# Patient Record
Sex: MALE
[De-identification: names, ages, dates, MRNs, and addresses within clinical notes are randomized per-mention and may not be internally consistent; named-entity substitution may affect disease eponyms.]

---

## 2017-08-29 ENCOUNTER — HOSPITAL ENCOUNTER (EMERGENCY)
Dept: HOSPITAL 31 - C.ER | Age: 22
Discharge: HOME | End: 2017-08-29
Payer: COMMERCIAL

## 2017-08-29 VITALS — TEMPERATURE: 98 F

## 2017-08-29 VITALS
OXYGEN SATURATION: 98 % | DIASTOLIC BLOOD PRESSURE: 65 MMHG | HEART RATE: 98 BPM | RESPIRATION RATE: 18 BRPM | SYSTOLIC BLOOD PRESSURE: 112 MMHG

## 2017-08-29 VITALS — BODY MASS INDEX: 23.5 KG/M2
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
## 2017-08-29 DIAGNOSIS — S92.421A: Primary | ICD-10-CM

## 2017-08-29 PROCEDURE — 96372 THER/PROPH/DIAG INJ SC/IM: CPT

## 2017-08-29 PROCEDURE — 73630 X-RAY EXAM OF FOOT: CPT

## 2017-08-29 PROCEDURE — 99285 EMERGENCY DEPT VISIT HI MDM: CPT

## 2017-08-29 NOTE — C.PDOC
History Of Present Illness


22 year old male presents to the ED with complaints of pain to first and second 

toes of the right foot after slipping and stubbing toes on steps 20 minutes 

prior to arrival. Patient denies changes in sensation or other complaints at 

this time. 


Time Seen by Provider: 08/29/17 16:30


Chief Complaint (Nursing): Lower Extremity Problem/Injury


History Per: Patient


History/Exam Limitations: no limitations


Onset/Duration Of Symptoms: Mins


Current Symptoms Are (Timing): Still Present


Recent travel outside of the [place]: No





- Ankle/Foot


Description Of Injury: Struck With Object ("stubbed" his foot )





Past Medical History


Reviewed: Historical Data, Nursing Documentation, Vital Signs


Vital Signs: 


 Last Vital Signs











Temp  98 F   08/29/17 16:29


 


Pulse  100 H  08/29/17 16:29


 


Resp  20   08/29/17 16:29


 


BP  112/74   08/29/17 16:29


 


Pulse Ox  100   08/29/17 18:04














- [facility] Procedures








OTHER GROUP THERAPY (07/21/13)








Family History: States: Other


Other Family History: Non-contributory.





- Social History


Hx Tobacco Use: Yes


Hx Alcohol Use: Yes


Hx Substance Use: No





- Immunization History


Hx Tetanus Toxoid Vaccination: No


Hx Influenza Vaccination: No


Hx Pneumococcal Vaccination: No





Review Of Systems


Constitutional: Negative for: Fever


Cardiovascular: Negative for: Chest Pain


Respiratory: Negative for: Shortness of Breath


Gastrointestinal: Negative for: Nausea, Vomiting


Musculoskeletal: Positive for: Foot Pain (right first and second toe pain )


Neurological: Negative for: Weakness, Numbness





Physical Exam





- Physical Exam


Appears: Non-toxic, No Acute Distress


Skin: Warm, Dry


Head: Atraumatic


Eye(s): bilateral: Normal Inspection


Chest: Symmetrical, No Deformity


Extremity: Normal ROM, Tenderness (Tenderness to first and second toes of the 

right foot ), No Pedal Edema, No Calf Tenderness, Capillary Refill (good 

capillary refill, less than two seconds ), No Deformity, No Swelling, Other (No 

tenderness over right ankle or dorsum of the foot)


Pulses: Left Dorsalis Pedis: Normal, Right Dorsalis Pedis: Normal


Neurological/Psych: Oriented x3, Normal Speech, Normal Cognition, Normal Motor, 

Normal Sensation


Gait: Steady





ED Course And Treatment


O2 Sat by Pulse Oximetry: 100 (room air )





- Other Rad


  ** Right Foot Radiographs


X-Ray: Interpreted by Me, Viewed By Me


Interpretation: FINDINGS:  BONES:  Mildly displaced fracture of the proximal 

1st phalanx with intra-articular extension. The remainder the visualized 

osseous structures appear intact.  JOINTS:  No dislocation.  SOFT TISSUES:  

Soft tissue swelling. No evidence of radiopaque foreign body.  OTHER FINDINGS:  

None.  IMPRESSION:  Mildly displaced fracture of the proximal 1st phalanx with 

intra-articular extension. Soft tissue swelling.


Progress Note: Right foot X-ray was taken. Patient was given Toradol and ice 

was applied.





Medical Decision Making


Medical Decision Making: 





xr r foot- intra-articular fracture distal phalanx great toe


podiatry resident came and eval in the ED and placed surgical shoe. 


pt to f/u in the office w Dr Noyola. 








Disposition





- Disposition


Referrals: 


Kaitlyn Noyola DPM [Staff Provider] - 


Disposition: HOME/ ROUTINE


Disposition Time: 18:00


Condition: STABLE


Additional Instructions: 


Please follow up with the foot doctor. Return to the ER for any worsening 

symptoms or for any other concerns. 


Prescriptions: 


Acetaminophen with Codeine [Tylenol with Codeine #3 Tablet] 1 each PO Q4H PRN #

6 tablet


 PRN Reason: Pain, Moderate (4-7)


Naproxen [Naprosyn] 500 mg PO Q12H PRN #10 tablet


 PRN Reason: Pain, Moderate (4-7)


Instructions:  Toe Fracture (ED)


Forms:  [facility] (English), Work Excuse





- Clinical Impression


Clinical Impression: 


 Fracture of great toe








- Scribe Statement


The provider has reviewed the documentation as recorded by the Natalieibtaye Gillespie





All medical record entries made by the Natalieibtaye were at my direction and 

personally dictated by me. I have reviewed the chart and agree that the record 

accurately reflects my personal performance of the history, physical exam, 

medical decision making, and the department course for this patient. I have 

also personally directed, reviewed, and agree with the discharge instructions 

and disposition.

## 2017-08-29 NOTE — CP.PCM.CON
History of Present Illness





- History of Present Illness


History of Present Illness: 





21 y/o male with no significant PMHx seen at bedside in ED complaining of right 

toe pain secondary to trauma. Patient states that he was at his apartment 

complex and slipped which caused him to stub his right toe. Patient states that 

the injury occured about an hour ago. Patient rates his pain as 10/10 on VAS 

but states that it is well managed with the pain medication. Patient denies of 

any other pedal complains at this time. Patient denies of any recent F/N/V/C/SOB

/CP.





PMHx: Denies


PSHx: Denies


Allergies: Fruits


SHx: 1 Pack a week smoking, occasional EtOH, denies of any illicit drug usage 





Review of Systems





- Constitutional


Constitutional: As Per HPI





Past Patient History





- Infectious Disease


Hx of Infectious Diseases: None





- Past Social History


Smoking Status: Light Smoker < 10 Cigarettes Daily





- CARDIAC


Hx Cardiac Disorders: No





- PULMONARY


Hx Tuberculosis: No





- NEUROLOGICAL


HX Cerebrovascular Accident: No





- HEMATOLOGICAL/ONCOLOGICAL


Hx Cancer: No





- PSYCHIATRIC


Hx Substance Use: No





- SURGICAL HISTORY


Hx Surgeries: No





- ANESTHESIA


Hx Anesthesia: No





Meds


Home Medications: 


 Home Medication List











 Medication  Instructions  Recorded  Confirmed  Type


 


Acetaminophen with Codeine 1 each PO Q4H PRN #6 tablet 08/29/17  Rx





[Tylenol with Codeine #3 Tablet]    


 


Naproxen [Naprosyn] 500 mg PO Q12H PRN #10 tablet 08/29/17  Rx











Allergies/Adverse Reactions: 


 Allergies











Allergy/AdvReac Type Severity Reaction Status Date / Time


 


apple Allergy  SWELLING Verified 08/29/17 16:32


 


plum Allergy  SWELLING Verified 08/29/17 16:32


 


peaches Allergy  SWELLING Uncoded 08/29/17 16:32


 


pears Allergy  SWELLING Uncoded 08/29/17 16:32














Physical Exam





- Constitutional


Appears: Well, Non-toxic, No Acute Distress





- Extremities Exam


Additional comments: 





Bilateral LE exam:





VASC: DP/PT pulses are palpable 2/4 b/l, CRT: < 3 sec to all digits, TG: warm 

to cool from proximal to distal, mild non-pitting edema noted at the distal tip 

of the right hallux with surrounding erythema





DERM: no open lesions, no interdigital maceration, no clinical suspicion of 

active infection





NEURO: protective sensation grossly intact 





ORTHO: pain on palpation of the distal right hallux, pain on active and passive 

ROM of the right hallux, no pain on ROM at R first ray, no pain on palpation of 

the sessamoid, active and passive ROM within normal limit in all 4 compartments

, MMT: 5/5 on DF, PF, inversion and eversion bilaterally





- Neurological Exam


Neurological exam: Alert, Oriented x3





- Psychiatric Exam


Psychiatric exam: Normal Affect, Normal Mood





Results





- Vital Signs


Recent Vital Signs: 


 Last Vital Signs











Temp  98 F   08/29/17 16:29


 


Pulse  100 H  08/29/17 16:29


 


Resp  20   08/29/17 16:29


 


BP  112/74   08/29/17 16:29


 


Pulse Ox  100   08/29/17 17:32














Assessment & Plan





- Assessment and Plan (Free Text)


Assessment: 





21 y/o male seen at bedside in ED for pain and swelling of the right hallux 

secondary to non-displaced, intra-articular fracture of the distal phalanx 


Plan: 





Patient seen and evaluated at bedside in ED


Patient discussed in details with attending Dr. Dennys Garcia reviewed (afebrile)


X-rays taken and evaluated: 


   -Vertical radiolucent line extending into DIPJ noted at the distal phalanx 

of the R hallux on an AP view indicating a fracture. No displacement of the 

fracture noted and alignment at the joint well preserved 


Coban applied to the Right hallux


Patient educated to Ice and elevate the RLE


Patient's right foot placed in a post-op shoe


Patient educated to remain in a post-op shoe at all times


Patient educated the possible consequences of not being compliant with the 

treatment plan


Patient educated to take over the counter ibuprofen if he has too much pain


Patient demonstrated verbal understanding


Thank you for allowing the podiatry team to take part in patient's care











- Date & Time


Date: 08/29/17


Time: 18:17

## 2017-08-29 NOTE — RAD
PROCEDURE:  Left Foot Radiographs.



HISTORY:

stubbed 1st-2nd toes  



COMPARISON:

None available.



FINDINGS:



BONES:

Mildly displaced fracture of the proximal 1st phalanx with 

intra-articular extension. The remainder the visualized osseous 

structures appear intact. 



JOINTS:

No dislocation. 



SOFT TISSUES:

Soft tissue swelling. No evidence of radiopaque foreign body. 



OTHER FINDINGS:

None.



IMPRESSION:

Mildly displaced fracture of the proximal 1st phalanx with 

intra-articular extension. Soft tissue swelling.

## 2017-09-09 ENCOUNTER — HOSPITAL ENCOUNTER (EMERGENCY)
Dept: HOSPITAL 31 - C.ER | Age: 22
Discharge: HOME | End: 2017-09-09
Payer: MEDICAID

## 2017-09-09 VITALS
RESPIRATION RATE: 18 BRPM | HEART RATE: 78 BPM | TEMPERATURE: 98.1 F | SYSTOLIC BLOOD PRESSURE: 115 MMHG | OXYGEN SATURATION: 100 % | DIASTOLIC BLOOD PRESSURE: 74 MMHG

## 2017-09-09 VITALS — BODY MASS INDEX: 23.5 KG/M2

## 2017-09-09 DIAGNOSIS — S92.401G: Primary | ICD-10-CM
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
## 2017-09-09 NOTE — C.PDOC
History Of Present Illness


22 year old male who presents to the ER with a complaint of right foot pain. 

Patient was recently see in the ED on 08/29/17 after sustaining a fracture to 

the right great toe. Patient reports he stubbed his toe on a mirror earlier 

today and states his coworker also stepped on foot, but he was wearing ortho 

shoe. Patient notes he had an appointment with podiatry on Monday; however, he 

was not able to go due to work and school. Patient states he has run out of the 

pain medication he was given during his last visit and is requesting a refill. 

Denies weakness or numbness.


Time Seen by Provider: 09/09/17 22:30


Chief Complaint (Nursing): Lower Extremity Problem/Injury


History Per: Patient


History/Exam Limitations: no limitations


Onset/Duration Of Symptoms: Hrs


Current Symptoms Are (Timing): Still Present


Recent travel outside of the [place]: No





- Ankle/Foot


Description Of Injury: Struck With Object


Currently Unable To: Bear Weight





Past Medical History


Reviewed: Historical Data, Nursing Documentation, Vital Signs


Vital Signs: 


 Last Vital Signs











Temp  98.1 F   09/09/17 22:17


 


Pulse  78   09/09/17 22:17


 


Resp  18   09/09/17 22:17


 


BP  115/74   09/09/17 22:17


 


Pulse Ox  100   09/09/17 23:16














- Medical History


PMH: Fractures (toe)


Surgical History: No Surg Hx





- [facility] Procedures








OTHER GROUP THERAPY (07/21/13)








Family History: States: Unknown Family Hx





- Social History


Hx Tobacco Use: Yes


Hx Alcohol Use: Yes


Hx Substance Use: No





- Immunization History


Hx Tetanus Toxoid Vaccination: No


Hx Influenza Vaccination: No


Hx Pneumococcal Vaccination: No





Review Of Systems


Musculoskeletal: Positive for: Foot Pain


Neurological: Negative for: Weakness, Numbness





Physical Exam





- Physical Exam


Appears: Non-toxic


Skin: Normal Color, Warm, Dry


Head: Atraumatic, Normacephalic


Eye(s): bilateral: Normal Inspection


Oral Mucosa: Moist


Neck: Normal ROM


Chest: Symmetrical


Extremity: Normal ROM, Tenderness (right great toe), Capillary Refill (Good < 

2seconds), Swelling (Mild to right great toe)


Pulses: Left Dorsalis Pedis: Normal, Right Dorsalis Pedis: Normal


Neurological/Psych: Oriented x3, Normal Speech


Gait: Steady





ED Course And Treatment


O2 Sat by Pulse Oximetry: 100 (Room air)


Pulse Ox Interpretation: Normal





Medical Decision Making


Medical Decision Making: 





Impression: 22 year old male with fracture to great toe.





Patient refuses x-rays and is requesting a refill of his pain medication until 

he can follow up with podiatry.





Disposition


Counseled Patient/Family Regarding: Diagnosis, Need For Followup, Rx Given





- Disposition


Referrals: 


Kaitlyn Noyola DPM [Staff Provider] - 


Disposition: HOME/ ROUTINE


Disposition Time: 22:40


Condition: STABLE


Additional Instructions: 


Please follow up with podiatry


take pain medicine as needed


Prescriptions: 


traMADol [Ultram] 50 mg PO Q8 #20 tab


Instructions:  Toe Fracture (ED)


Forms:  [employer] [facility] (English), Work Excuse





- POA


Present On Arrival: None





- Clinical Impression


Clinical Impression: 


 Fracture of great toe








- Scribe Statement


The provider has reviewed the documentation as recorded by the Scribe





Dominic Velasquez





All medical record entries made by the Scribe were at my direction and 

personally dictated by me. I have reviewed the chart and agree that the record 

accurately reflects my personal performance of the history, physical exam, 

medical decision making, and the department course for this patient. I have 

also personally directed, reviewed, and agree with the discharge instructions 

and disposition.

## 2018-01-27 ENCOUNTER — HOSPITAL ENCOUNTER (EMERGENCY)
Dept: HOSPITAL 31 - C.ER | Age: 23
Discharge: HOME | End: 2018-01-27
Payer: MEDICAID

## 2018-01-27 VITALS
DIASTOLIC BLOOD PRESSURE: 72 MMHG | HEART RATE: 80 BPM | TEMPERATURE: 98.3 F | OXYGEN SATURATION: 99 % | RESPIRATION RATE: 18 BRPM | SYSTOLIC BLOOD PRESSURE: 109 MMHG

## 2018-01-27 VITALS — BODY MASS INDEX: 23.5 KG/M2

## 2018-01-27 DIAGNOSIS — N34.2: Primary | ICD-10-CM

## 2018-01-27 DIAGNOSIS — Z72.0: ICD-10-CM

## 2018-01-27 LAB
BACTERIA #/AREA URNS HPF: (no result) /[HPF]
BILIRUB UR-MCNC: NEGATIVE MG/DL
GLUCOSE UR STRIP-MCNC: NORMAL MG/DL
LEUKOCYTE ESTERASE UR-ACNC: (no result) LEU/UL
PH UR STRIP: 5 [PH] (ref 5–8)
PROT UR STRIP-MCNC: NEGATIVE MG/DL
RBC # UR STRIP: NEGATIVE /UL
SP GR UR STRIP: 1.03 (ref 1–1.03)
URINE NITRATE: NEGATIVE
UROBILINOGEN UR-MCNC: NORMAL MG/DL (ref 0.2–1)

## 2018-01-27 PROCEDURE — 99284 EMERGENCY DEPT VISIT MOD MDM: CPT

## 2018-01-27 PROCEDURE — 81001 URINALYSIS AUTO W/SCOPE: CPT

## 2018-01-27 PROCEDURE — 87086 URINE CULTURE/COLONY COUNT: CPT

## 2018-01-27 PROCEDURE — 87491 CHLMYD TRACH DNA AMP PROBE: CPT

## 2018-01-27 PROCEDURE — 87591 N.GONORRHOEAE DNA AMP PROB: CPT

## 2018-01-27 PROCEDURE — 96372 THER/PROPH/DIAG INJ SC/IM: CPT

## 2018-01-27 NOTE — C.PDOC
Time Seen by Provider: 01/27/18 16:42


Chief Complaint (Nursing): Male Genitourinary


History Per: Patient


Onset/Duration Of Symptoms: Days (3)


Current Symptoms Are (Timing): Still Present


Severity: Moderate


Quality Of Discomfort: Burning


Associated Symptoms: Urinary Symptoms


Alleviating Factors: None


Additional History Per: Prior Records





Past Medical History


Reviewed: Historical Data, Nursing Documentation, Vital Signs


Vital Signs: 





 Last Vital Signs











Temp  98.6 F   01/27/18 14:47


 


Pulse  97 H  01/27/18 14:47


 


Resp  16   01/27/18 14:47


 


BP  109/68   01/27/18 14:47


 


Pulse Ox  98   01/27/18 14:47














- Medical History


PMH: No Chronic Diseases, Fractures (toe)


Surgical History: No Surg Hx





- [facility] Procedures











OTHER GROUP THERAPY (07/21/13)








Family History: States: Unknown Family Hx





- Social History


Hx Tobacco Use: Yes


Hx Alcohol Use: Yes


Hx Substance Use: No





- Immunization History


Hx Tetanus Toxoid Vaccination: No


Hx Influenza Vaccination: No


Hx Pneumococcal Vaccination: No





Review Of Systems


Except As Marked, All Systems Reviewed And Found Negative.


Constitutional: Negative for: Fever, Weakness


Cardiovascular: Negative for: Chest Pain


Respiratory: Negative for: Shortness of Breath


Gastrointestinal: Negative for: Vomiting, Abdominal Pain, Diarrhea


Genitourinary: Positive for: Dysuria, Penile Discharge.  Negative for: Frequency

, Scrotal Pain


Musculoskeletal: Negative for: Neck Pain, Back Pain


Skin: Negative for: Rash


Neurological: Negative for: Weakness, Numbness





Physical Exam





- Physical Exam


Appears: Non-toxic, No Acute Distress


Skin: Normal Color, Warm, Dry, No Rash


Head: Atraumatic, Normacephalic


Eye(s): bilateral: Normal Inspection, PERRL, EOMI


Neck: Normal ROM, Supple


Gastrointestinal/Abdominal: Soft, No Tenderness


Back: No CVA Tenderness


Male Genital: No Testicular Tenderness, No Testicular Swelling, No Inguinal 

Tenderness, No Inguinal Swelling, No Scrotal Swelling, No Circumcised, Other (

white/yellow discharge)


Extremity: Normal ROM


Neurological/Psych: Oriented x3, Normal Motor, Normal Sensation





ED Course And Treatment





- Laboratory Results


Lab Interpretation: Abnormal


Interpretation Of Abnormal: Bacturia


O2 Sat by Pulse Oximetry: 98


Pulse Ox Interpretation: Normal





Medical Decision Making


Medical Decision Making: 


Pt has symptoms/signs of urethritis, but has bacturia on the U/A. Urine GC/

Chlam and C&S sent. 





Disposition


Counseled Patient/Family Regarding: Studies Performed, Diagnosis, Need For 

Followup, Rx Given





- Disposition


Referrals: 


Lindsay Lackey MD [Staff Provider] - 


Disposition: HOME/ ROUTINE


Disposition Time: 17:37


Condition: STABLE


Additional Instructions: 


Drink plenty of fluids. Follow up with a Urologist for further evaluation and 

treatment. Return to the ER if you develop fever, vomiting, abdominal pain, 

testicle pain, back pain worsening of symptoms or if you have any other 

concerns. 


Prescriptions: 


Ciprofloxacin [Cipro] 1 tab PO BID #14 tab


Instructions:  Nonspecific Urethritis in Men (ED)





- Clinical Impression


Clinical Impression: 


 Urethritis

## 2018-03-24 ENCOUNTER — HOSPITAL ENCOUNTER (EMERGENCY)
Dept: HOSPITAL 31 - C.ER | Age: 23
Discharge: HOME | End: 2018-03-24
Payer: MEDICAID

## 2018-03-24 VITALS
OXYGEN SATURATION: 100 % | RESPIRATION RATE: 18 BRPM | SYSTOLIC BLOOD PRESSURE: 121 MMHG | TEMPERATURE: 98.2 F | DIASTOLIC BLOOD PRESSURE: 82 MMHG | HEART RATE: 73 BPM

## 2018-03-24 VITALS — BODY MASS INDEX: 23.5 KG/M2

## 2018-03-24 DIAGNOSIS — L03.313: Primary | ICD-10-CM

## 2018-03-24 DIAGNOSIS — L73.9: ICD-10-CM

## 2018-03-24 NOTE — C.PDOC
History Of Present Illness


22 year old male, with no significant PMHx, presents to the ED for evaluation 

of a "bite" to his chest which he noticed 2-3 days ago. Pt notes he has little 

bumps all over his chest, one worse then others. Patient states he last shaved 

his chest 2 weeks ago. Denies itching, fever, discharge, or pain. Tetanus shot 

was 1 year ago. 


Time Seen by Provider: 03/24/18 07:49


Chief Complaint (Nursing): Abnormal Skin Integrity


History Per: Patient


History/Exam Limitations: no limitations


Onset/Duration Of Symptoms: Days (2-3)


Current Symptoms Are (Timing): Still Present


Location Of Injury: Anterior: Chest


Quality Of Symptoms: denies: Swollen, Draining


Additional History Per: Patient





Past Medical History


Reviewed: Historical Data, Nursing Documentation, Vital Signs


Vital Signs: 


 Last Vital Signs











Temp  98.2 F   03/24/18 07:45


 


Pulse  73   03/24/18 07:45


 


Resp  18   03/24/18 07:45


 


BP  121/82   03/24/18 07:45


 


Pulse Ox  100   03/24/18 08:06














- Medical History


PMH: Fractures (toe)


Surgical History: No Surg Hx





- [facility] Procedures








OTHER GROUP THERAPY (07/21/13)








Family History: States: Unknown Family Hx





- Social History


Hx Tobacco Use: Yes


Hx Alcohol Use: Yes


Hx Substance Use: No





- Immunization History


Hx Tetanus Toxoid Vaccination: No


Hx Influenza Vaccination: No


Hx Pneumococcal Vaccination: No





Review Of Systems


Constitutional: Negative for: Fever, Chills





Physical Exam





- Physical Exam


Appears: Non-toxic, No Acute Distress


Skin: Warm, Dry, Other ((+) L upper chest warm : central area of induration 

with 2 cm area of erythema ; multiple areas of erythema at the base of hair 

follicles )


Head: Atraumatic, Normacephalic


Eye(s): bilateral: Normal Inspection, EOMI


Nose: Normal


Oral Mucosa: Moist


Neck: Normal ROM, Supple


Chest: Symmetrical, No Deformity, No Tenderness


Cardiovascular: Rhythm Regular


Respiratory: Normal Breath Sounds, No Rales, No Rhonchi, No Wheezing


Extremity: Normal ROM, Capillary Refill (less than 2 seconds )


Neurological/Psych: Oriented x3, Normal Speech, Normal Cognition





ED Course And Treatment


O2 Sat by Pulse Oximetry: 100 (on RA)


Pulse Ox Interpretation: Normal


Progress Note: On reassessment, patient is resting comfortably, is showing no 

signs of distress, and is stable for discharge. No area of fluctuance . 

Instructed warm compresses and wound check in 2 days.  Advised to follow up 

with his PMD within 1-2 days for further evaluation and/or return to the ED if 

symptoms persist or worsen.





Disposition





- Disposition


Disposition: HOME/ ROUTINE


Disposition Time: 08:00


Condition: STABLE


Additional Instructions: 


Follow up with primary medical doctor in 1-3 days without fail for further 

evaluation. Take medications as prescribed. Return to the emergency department 

at any time if symptoms persist or worsen.








Prescriptions: 


Cephalexin [cephalexin] 500 mg PO BID 7 Days  cap


Mupirocin 2% Ointment [Bactroban Ointment] 1 appl TP TID #1 tube


Sulfamethoxazole/Trimethoprim [Bactrim  mg-160 mg] 1 tab PO BID #14 tab


Instructions:  Folliculitis (DC)


Forms:  [facility] (English)





- Clinical Impression


Clinical Impression: 


 Cellulitis, Folliculitis








- PA / NP / Resident Statement


MD/DO has reviewed & agrees with the documentation as recorded.





- Scribe Statement


The provider has reviewed the documentation as recorded by the Scribe (Becki Johnson)








All medical record entries made by the Scribe were at my direction and 

personally dictated by me. I have reviewed the chart and agree that the record 

accurately reflects my personal performance of the history, physical exam, 

medical decision making, and the department course for this patient. I have 

also personally directed, reviewed, and agree with the discharge instructions 

and disposition.

## 2018-10-24 ENCOUNTER — HOSPITAL ENCOUNTER (EMERGENCY)
Dept: HOSPITAL 31 - C.ER | Age: 23
Discharge: HOME | End: 2018-10-24
Payer: MEDICAID

## 2018-10-24 VITALS — HEART RATE: 68 BPM | DIASTOLIC BLOOD PRESSURE: 73 MMHG | RESPIRATION RATE: 18 BRPM | SYSTOLIC BLOOD PRESSURE: 118 MMHG

## 2018-10-24 VITALS — TEMPERATURE: 99 F

## 2018-10-24 VITALS — BODY MASS INDEX: 23.5 KG/M2

## 2018-10-24 VITALS — OXYGEN SATURATION: 99 %

## 2018-10-24 DIAGNOSIS — L02.411: Primary | ICD-10-CM

## 2018-10-24 PROCEDURE — 99283 EMERGENCY DEPT VISIT LOW MDM: CPT

## 2018-10-24 PROCEDURE — 10060 I&D ABSCESS SIMPLE/SINGLE: CPT

## 2018-10-24 PROCEDURE — 87181 SC STD AGAR DILUTION PER AGT: CPT

## 2018-10-24 PROCEDURE — 96372 THER/PROPH/DIAG INJ SC/IM: CPT

## 2018-10-24 PROCEDURE — 87070 CULTURE OTHR SPECIMN AEROBIC: CPT

## 2018-10-24 NOTE — C.PDOC
History Of Present Illness


24yo male, comes to ER for evaluation of redness, swelling and pain to his right

axilla. Patient states it has been increasing in size x 1.5 weeks; he was seen 

by his PMD who prescribed him antibiotics. Otherwise, he denies any fever or 

chills. Patient denies any arm numbness or weakness.


Time Seen by Provider: 10/24/18 16:50


Chief Complaint (Nursing): Abnormal Skin Integrity


History Per: Patient


History/Exam Limitations: no limitations


Onset/Duration Of Symptoms: Days


Current Symptoms Are (Timing): Still Present


Quality Of Symptoms: Painful, Swollen


Additional History Per: Patient





Past Medical History


Reviewed: Historical Data, Nursing Documentation, Vital Signs


Vital Signs: 





                                Last Vital Signs











Temp  99 F   10/24/18 16:22


 


Pulse  87   10/24/18 16:22


 


Resp  16   10/24/18 16:22


 


BP  113/81   10/24/18 16:22


 


Pulse Ox  99   10/24/18 16:22














- Medical History


PMH: No Chronic Diseases, Fractures (toe)


Surgical History: No Surg Hx





- [facility] Procedures











OTHER GROUP THERAPY (07/21/13)








Family History: States: Unknown Family Hx





- Social History


Hx Tobacco Use: Yes


Hx Alcohol Use: Yes


Hx Substance Use: No





- Immunization History


Hx Tetanus Toxoid Vaccination: No


Hx Influenza Vaccination: No


Hx Pneumococcal Vaccination: No





Review Of Systems


Constitutional: Negative for: Fever, Chills


Skin: Positive for: Other (abscess right axilla)





Physical Exam





- Physical Exam


Appears: Non-toxic, No Acute Distress


Skin: Warm, Dry


Neck: Supple


Chest: Symmetrical


Cardiovascular: Rhythm Regular


Respiratory: Normal Breath Sounds


Extremity: Normal ROM, No Deformity, Other (2 abscesses noted to right axilla; 

2cm diameter fluctuance and 1cm diameter fluctuance. Surrounding erythema with 

streaking as well.)


Pulses: Right Radial: Normal


Neurological/Psych: Oriented x3





ED Course And Treatment


O2 Sat by Pulse Oximetry: 99 (RA)


Pulse Ox Interpretation: Normal


Progress Note: I&D, see procedure note.  Patient to be d/c home with wound care 

instructions and told to follow up in 2-3 days.





- Incision & Drainage Of Abscess


Anesthesia: Lidocaine 1%


Used During Procedure: Continuous Pulse Oximetry


Prep Used: Sterile Water


Procedure: Incised W/Scalpel Blade#: (11), Drained Pus (copious), Irrigated 

Cavity W/Saline, Packed W/Gauze





Medical Decision Making


Medical Decision Making: 





navid; comes in w abscesses under right arm in axilla. been increasing in size 

x 1.5 wks. went to pmd who rxd abx. no numbness arm. no fc. 


ex: rt axilla w 2cm dm fluctuant abscess. 1cm dm fluctuant abscess. ttp. 

erythema w streaking. 


id. lido 2.


 purulent dc


packed fu 2 days for reassmt.





Disposition





- Disposition


Referrals: 


[facility] Talia Oliver, [Non-Staff] - 


Disposition: HOME/ ROUTINE


Disposition Time: 17:40


Condition: IMPROVED


Additional Instructions: 





LINDA MAK, thank you for letting us take care of you today. Your provider 

was Harvey Cespedes DO and you were treated for SKIN INFECTION. The emergency 

medical care you received today was directed at your acute symptoms. If you were

prescribed any medication, please fill it and take as directed. It may take 

several days for your symptoms to resolve. Return to the Emergency Department if

your symptoms worsen, do not improve, or if you have any other problems.





Please contact your doctor or call one of the physicians/clinics you have been 

referred to that are listed on the Patient Visit Information form that is 

included in your discharge packet. Bring any paperwork you were given at 

discharge with you along with any medications you are taking to your follow up 

visit. Our treatment cannot replace ongoing medical care by a primary care 

provider outside of the emergency department.





Thank you for allowing the "[facility] [place]" team to be part of your care today.














Keep area clean and dry.











Continue taking the antibiotics already prescribed by your primary care doctor.











Follow up with your primary care doctor or the emergency room in 2 days for 

packing removal.


Instructions:  Skin Abscess, Wound Incision and Drainage (DC)


Forms:  [facility] (English)





- Clinical Impression


Clinical Impression: 


 Skin abscess








- Scribe Statement


The provider has reviewed the documentation as recorded by the Soledad Pierce


Provider Attestation: 


All medical record entries made by the Natalieibtaye were at my direction and 

personally dictated by me. I have reviewed the chart and agree that the record 

accurately reflects my personal performance of the history, physical exam, 

medical decision making, and the department course for this patient. I have also

personally directed, reviewed, and agree with the discharge instructions and 

disposition.

## 2018-11-08 ENCOUNTER — HOSPITAL ENCOUNTER (EMERGENCY)
Dept: HOSPITAL 31 - C.ER | Age: 23
Discharge: HOME | End: 2018-11-08
Payer: MEDICAID

## 2018-11-08 VITALS
SYSTOLIC BLOOD PRESSURE: 122 MMHG | RESPIRATION RATE: 20 BRPM | DIASTOLIC BLOOD PRESSURE: 73 MMHG | TEMPERATURE: 99.5 F | OXYGEN SATURATION: 99 % | HEART RATE: 87 BPM

## 2018-11-08 VITALS — BODY MASS INDEX: 23.5 KG/M2

## 2018-11-08 DIAGNOSIS — Z72.0: ICD-10-CM

## 2018-11-08 DIAGNOSIS — L02.411: Primary | ICD-10-CM

## 2018-11-08 NOTE — C.PDOC
History Of Present Illness


22 y/o male presents to the ER complaining of redness to the right axilla which 

has been present for the past 2 days. Patient states that he was evaluated for 

an abscess to the right axilla in [place] a few weeks ago.At the time, he was 

treated with Augmentin. He was called and instructed to change his antibiotics, 

but he never changed his antibiotics. Denies having fever and chills. 


Time Seen by Provider: 11/08/18 19:25


Chief Complaint (Nursing): Abnormal Skin Integrity


History Per: Patient


History/Exam Limitations: no limitations


Onset/Duration Of Symptoms: Days


Current Symptoms Are (Timing): Still Present


Severity: Moderate





Past Medical History


Reviewed: Historical Data, Nursing Documentation, Vital Signs


Vital Signs: 





                                Last Vital Signs











Temp  99.5 F   11/08/18 19:22


 


Pulse  87   11/08/18 19:22


 


Resp  20   11/08/18 19:22


 


BP  122/73   11/08/18 19:22


 


Pulse Ox  99   11/08/18 19:22














- Medical History


PMH: Fractures (toe)


Surgical History: No Surg Hx





- [facility] Procedures











OTHER GROUP THERAPY (07/21/13)








Family History: States: No Known Family Hx





- Social History


Hx Tobacco Use: Yes


Hx Alcohol Use: Yes


Hx Substance Use: No





- Immunization History


Hx Tetanus Toxoid Vaccination: No


Hx Influenza Vaccination: No


Hx Pneumococcal Vaccination: No





Review Of Systems


Except As Marked, All Systems Reviewed And Found Negative.


Constitutional: Negative for: Fever, Chills


Skin: Positive for: Other (redness to right axilla)





Physical Exam





- Physical Exam


Appears: Non-toxic, No Acute Distress


Skin: Normal Color, Warm, Dry, Other (erythema to right axilla, no swelling, no 

fluctuance)


Head: Atraumatic, Normacephalic


Eye(s): bilateral: Normal Inspection


Nose: Normal


Oral Mucosa: Moist


Neck: Supple


Chest: Symmetrical


Extremity: Normal ROM, Tenderness (minimal tenderness to right axilla)


Neurological/Psych: Oriented x3, Normal Speech





ED Course And Treatment


O2 Sat by Pulse Oximetry: 99 (RA)


Pulse Ox Interpretation: Normal


Progress Note: I reviewed patient's chart from previous visit. patient had I &D 

of abscess, was discharged of Augmentin. Wound culture came back pos for MRSA. I

called [facility] pharmacy and they noted that patient had an electronic Rx for 

Clindamycin that he never picked up. Patient was given a new Rx for Clindamycin 

and was instructed to put warm compresses to affected area. He has been 

instructed to f/u with PMD and to return to ER if abscess becomes worse.





Disposition





- Disposition


Disposition: HOME/ ROUTINE


Disposition Time: 19:48


Condition: STABLE


Additional Instructions: 


Follow up with your PMD within 1-2 days. Return to ED if feel worse.


Instructions:  Boil, Methicillin-Resistant Staphylococcus aureus (MRSA)


Forms:  [facility] (English)





- Clinical Impression


Clinical Impression: 


 Abscess








- PA / NP / Resident Statement


MD/DO has reviewed & agrees with the documentation as recorded.





- Scribe Statement


The provider has reviewed the documentation as recorded by the Soledad Webster





Provider Attestation





All medical record entries made by the Natalieibtaye were at my direction and 

personally dictated by me. I have reviewed the chart and agree that the record 

accurately reflects my personal performance of the history, physical exam, 

medical decision making, and the department course for this patient. I have also

personally directed, reviewed, and agree with the discharge instructions and 

disposition.